# Patient Record
Sex: MALE | Race: WHITE | Employment: FULL TIME | URBAN - METROPOLITAN AREA
[De-identification: names, ages, dates, MRNs, and addresses within clinical notes are randomized per-mention and may not be internally consistent; named-entity substitution may affect disease eponyms.]

---

## 2023-01-19 ENCOUNTER — HOSPITAL ENCOUNTER (EMERGENCY)
Age: 38
Discharge: ANOTHER HOSPITAL | End: 2023-01-20
Attending: EMERGENCY MEDICINE
Payer: COMMERCIAL

## 2023-01-19 DIAGNOSIS — R45.851 SUICIDAL IDEATIONS: Primary | ICD-10-CM

## 2023-01-19 LAB
A/G RATIO: 2 (ref 1.1–2.2)
ACETAMINOPHEN LEVEL: <5 UG/ML (ref 10–30)
ALBUMIN SERPL-MCNC: 5 G/DL (ref 3.4–5)
ALP BLD-CCNC: 77 U/L (ref 40–129)
ALT SERPL-CCNC: 18 U/L (ref 10–40)
AMPHETAMINE SCREEN, URINE: NORMAL
ANION GAP SERPL CALCULATED.3IONS-SCNC: 11 MMOL/L (ref 3–16)
AST SERPL-CCNC: 15 U/L (ref 15–37)
BARBITURATE SCREEN URINE: NORMAL
BASOPHILS ABSOLUTE: 0.1 K/UL (ref 0–0.2)
BASOPHILS RELATIVE PERCENT: 1.1 %
BENZODIAZEPINE SCREEN, URINE: NORMAL
BILIRUB SERPL-MCNC: 0.3 MG/DL (ref 0–1)
BUN BLDV-MCNC: 12 MG/DL (ref 7–20)
CALCIUM SERPL-MCNC: 9.1 MG/DL (ref 8.3–10.6)
CANNABINOID SCREEN URINE: NORMAL
CHLORIDE BLD-SCNC: 103 MMOL/L (ref 99–110)
CO2: 23 MMOL/L (ref 21–32)
COCAINE METABOLITE SCREEN URINE: NORMAL
CREAT SERPL-MCNC: 0.9 MG/DL (ref 0.9–1.3)
EOSINOPHILS ABSOLUTE: 0.1 K/UL (ref 0–0.6)
EOSINOPHILS RELATIVE PERCENT: 0.9 %
ETHANOL: NORMAL MG/DL (ref 0–0.08)
FENTANYL SCREEN, URINE: NORMAL
GFR SERPL CREATININE-BSD FRML MDRD: >60 ML/MIN/{1.73_M2}
GLUCOSE BLD-MCNC: 97 MG/DL (ref 70–99)
HCT VFR BLD CALC: 43.8 % (ref 40.5–52.5)
HEMOGLOBIN: 15 G/DL (ref 13.5–17.5)
LYMPHOCYTES ABSOLUTE: 1.8 K/UL (ref 1–5.1)
LYMPHOCYTES RELATIVE PERCENT: 28.8 %
Lab: NORMAL
MCH RBC QN AUTO: 29.9 PG (ref 26–34)
MCHC RBC AUTO-ENTMCNC: 34.2 G/DL (ref 31–36)
MCV RBC AUTO: 87.3 FL (ref 80–100)
METHADONE SCREEN, URINE: NORMAL
MONOCYTES ABSOLUTE: 0.7 K/UL (ref 0–1.3)
MONOCYTES RELATIVE PERCENT: 10.7 %
NEUTROPHILS ABSOLUTE: 3.6 K/UL (ref 1.7–7.7)
NEUTROPHILS RELATIVE PERCENT: 58.5 %
OPIATE SCREEN URINE: NORMAL
OXYCODONE URINE: NORMAL
PDW BLD-RTO: 14.1 % (ref 12.4–15.4)
PH UA: 5
PHENCYCLIDINE SCREEN URINE: NORMAL
PLATELET # BLD: 185 K/UL (ref 135–450)
PMV BLD AUTO: 8.5 FL (ref 5–10.5)
POTASSIUM REFLEX MAGNESIUM: 4 MMOL/L (ref 3.5–5.1)
RBC # BLD: 5.01 M/UL (ref 4.2–5.9)
SALICYLATE, SERUM: <0.3 MG/DL (ref 15–30)
SARS-COV-2, NAAT: NOT DETECTED
SODIUM BLD-SCNC: 137 MMOL/L (ref 136–145)
TOTAL PROTEIN: 7.5 G/DL (ref 6.4–8.2)
WBC # BLD: 6.2 K/UL (ref 4–11)

## 2023-01-19 PROCEDURE — 80053 COMPREHEN METABOLIC PANEL: CPT

## 2023-01-19 PROCEDURE — 82077 ASSAY SPEC XCP UR&BREATH IA: CPT

## 2023-01-19 PROCEDURE — 80307 DRUG TEST PRSMV CHEM ANLYZR: CPT

## 2023-01-19 PROCEDURE — 80143 DRUG ASSAY ACETAMINOPHEN: CPT

## 2023-01-19 PROCEDURE — 87635 SARS-COV-2 COVID-19 AMP PRB: CPT

## 2023-01-19 PROCEDURE — 80179 DRUG ASSAY SALICYLATE: CPT

## 2023-01-19 PROCEDURE — 99285 EMERGENCY DEPT VISIT HI MDM: CPT

## 2023-01-19 PROCEDURE — 85025 COMPLETE CBC W/AUTO DIFF WBC: CPT

## 2023-01-19 RX ORDER — TRAZODONE HYDROCHLORIDE 50 MG/1
TABLET ORAL
COMMUNITY
Start: 2022-10-24

## 2023-01-19 RX ORDER — BUPROPION HYDROCHLORIDE 150 MG/1
TABLET, EXTENDED RELEASE ORAL
COMMUNITY
Start: 2022-10-24

## 2023-01-19 ASSESSMENT — PAIN - FUNCTIONAL ASSESSMENT: PAIN_FUNCTIONAL_ASSESSMENT: NONE - DENIES PAIN

## 2023-01-19 NOTE — ED NOTES
All personal belongings removed from patient and placed in bags with patient label. Bags contain shoes, pill bottle of Wellbutrin, wallet, shirt, hat, and pants. Wife, Lynn Stallworth, has possession of phone, ring, and smart watch.       Ludmila Verdin RN  01/19/23 6183

## 2023-01-19 NOTE — ED NOTES
ED RN Nicolas Davis removed all personal belongings and items from ED RM 05. All patient belongings placed in bag with patient label and placed outside of room with sitter. Daniel Sahu cowboy boots, Pill bottle of Wellbutrin, wallet, shirt, hat and pants. Patient wife Jessie Patterson has patients phone, ring, and smart watch. Karen AU-CNP at bedside.      Dolly Mcintosh  01/19/23 5208

## 2023-01-19 NOTE — ED NOTES
Patient resting comfortably in bed speaking and laughing with wife. Labs sent, waiting for patient to be able to urinate.      Dolly Mcintosh  01/19/23 9700

## 2023-01-20 VITALS
SYSTOLIC BLOOD PRESSURE: 111 MMHG | OXYGEN SATURATION: 95 % | RESPIRATION RATE: 16 BRPM | BODY MASS INDEX: 27.83 KG/M2 | TEMPERATURE: 97.5 F | DIASTOLIC BLOOD PRESSURE: 74 MMHG | WEIGHT: 210 LBS | HEIGHT: 73 IN | HEART RATE: 60 BPM

## 2023-01-20 NOTE — ED NOTES
Level of Care Disposition:  Hold in BAC until tomorrow--patient will be placed as a behavioral health hold, and be admitted in the morning. Patient was seen by ED provider and [facility] staff. The case presented to psychiatric provider on-call Claudia Hinton MD.  Based on the ED evaluation and information presented to the provider by Jass Delaney it is the recommendation of the on call psychiatric provider that inpatient hospitalization is the least restrictive environment for the patient at this time. The patient will be admitted to the inpatient unit.  Admitting provider did not order suicide precautions based on patient cesar for safety     Yoly Orozco RN  01/20/23 8849

## 2023-01-20 NOTE — CONSULTS
[facility],[address] and spoke to Pillai. Per Lowell \"It'll be quite a bit\". ED Provider made aware. 2140- Kellen RN called from [facility] stating she was ready to telepsych. 2148- Telepsych in process. 65- Telepsych completed with Kellen LEIJA.  Lily Smith RN for update. Per Neema Lang, Finishing up last patients chart and then she will call . 1- Kellen LEIJA called stating they want patient to be admitted but [facility] has no bed availability until tomorrow possibly. Patient can be a hold in [place] ED or tx to another facility. 200- Called [facility], spoke to [facility] RN to send packet to [facility]. 0107- Samantha LEIJA [facility] faxed packet. [address] [facility] called. [facility] accepted to [facility] by [employer]. [facility] needs 72 hr hold faxed to [phone].  0155- 72 hr hold faxed. [address] Jaycob Abrams and spoke to [place]. Per [place] did not receive fax. 0230- resent 72 hour hold. Santaquin confirmed she received it. 46- Per [place]quin with [facility] intake, patient going to the [facility] and report number is [id number]. Transport scheduled with [address] ambulance for [facility] 55 made aware of bed assignment and report # given. Also update on transport ETA.

## 2023-01-20 NOTE — ED PROVIDER NOTES
I independently performed a history and physical on Desirae Duenas. I personally saw the patient and performed a substantive portion of the visit including all aspects of the medical decision making. All diagnostic, treatment, and disposition decisions were made by myself in conjunction with the advanced practice provider. I have participated in the medical decision making and directed the treatment plan and disposition of the patient. For further details of [facility] emergency department encounter, please see the advanced practice provider's documentation. CHIEF COMPLAINT  Chief Complaint   Patient presents with    Suicidal     Pt arrives with Wife with c/o suicidal thoughts. Pt states he is on Wellbutrin, but thinks he needs his dose increased. Pt states he has a plan - any object he sees he states he can plan to use. Briefly, Desirae Duenas is a 30 y.o. male  who presents to the ED complaining of suicidal thoughts    FOCUSED PHYSICAL EXAMINATION  /63   Pulse 75   Temp 98 °F (36.7 °C) (Oral)   Resp 18   Ht 6' 1\" (1.854 m)   Wt 210 lb (95.3 kg)   SpO2 97%   BMI 27.71 kg/m²      Focused physical examination:  General appearance:  Cooperative. No acute distress. Skin:  Warm. Dry. Eye:  Extraocular movements intact. Ears, nose, mouth and throat:  Oral mucosa moist,  Neck:  Trachea midline. Heart:  Regular rate and rhythm  Perfusion:  intact  Respiratory:  Respirations nonlabored. Lungs clear to auscultation bilaterally. Abdominal:   Non distended. Nontender  Neurological:  Alert and oriented x 3. Moves all extremities spontaneously  Musculoskeletal:   Normal ROM, no deformities          Psychiatric: Denies hallucinations but does endorse suicidal thoughts    Patient presents to the emergency department today with suicidal thoughts. Medically cleared here. Seen by psychiatry with request for admission. Otherwise hemodynamically stable at this time.   No concern for any other acute medical etiology to the patient's symptoms    History From: Patient         Chronic Conditions: Noted in HPI    CONSULTS: (Who and What was discussed)  None            Disposition Considerations (Tests not ordered but considered, Shared Decision Making, Pt Expectation of Test or Tx.):     During the patient's ED course, the patient was given:  Medications - No data to display     CLINICAL IMPRESSION  1. Suicidal ideations        DISPOSITION  Transfer      This chart was created using Dragon dictation software. Efforts were made by me to ensure accuracy, however some errors may be present due to limitations of this technology.             Wolfgang Heaton MD  01/20/23 6052

## 2023-01-20 NOTE — ED NOTES
Sitter at bedside. Pt in paper scrubs. All belongings removed from room by previous RN.       Artemio Schneider RN  01/19/23 5102

## 2023-01-20 NOTE — ED PROVIDER NOTES
[facility]  ED  EMERGENCY DEPARTMENT ENCOUNTER        Pt Name: Bernice Crabtree  MRN: [id number]  Armstrevorgfronn 1985  Date of evaluation: 1/19/2023  Provider: ANGELY Urbano CNP  PCP: No primary care provider on file. Note Started: 11:52 PM EST 1/19/23      SHARAD. I have evaluated this patient. My supervising physician was available for consultation. CHIEF COMPLAINT       Chief Complaint   Patient presents with    Suicidal     Pt arrives with Wife with c/o suicidal thoughts. Pt states he is on Wellbutrin, but thinks he needs his dose increased. Pt states he has a plan - any object he sees he states he can plan to use. HISTORY OF PRESENT ILLNESS: 1 or more Elements     History from : Patient    Limitations to history : None    Bernice Crabtree is a 30 y.o. male who presents with complaints of suicidal thoughts. Patient states that he is on medications for his depression but he thinks that he needs his dose increased, he states that he has been having suicidal thoughts for a while, he states that he does not actually have a set plan but he says he sees different objects and thinks of how he could kill himself with that object. He denies any chest pain difficulty breathing or shortness of breath no abdominal pain, nausea or vomiting he denies any drug use or alcohol abuse. He is here for further evaluation    Nursing Notes were all reviewed and agreed with or any disagreements were addressed in the HPI. REVIEW OF SYSTEMS :      Review of Systems    Positives and Pertinent negatives as per HPI. SURGICAL HISTORY   History reviewed. No pertinent surgical history.     CURRENTMEDICATIONS       Previous Medications    BUPROPION (WELLBUTRIN SR) 150 MG EXTENDED RELEASE TABLET    TAKE 1 TABLET BY MOUTH TWICE A DAY    TRAZODONE (DESYREL) 50 MG TABLET    TAKE 1 TAB(S) ORALLY ONCE A DAY, AT BEDTIME, AS NEEDED       ALLERGIES     Demerol hcl [meperidine]    FAMILYHISTORY     History reviewed. No pertinent family history. SOCIAL HISTORY       Social History     Tobacco Use    Smoking status: Never    Smokeless tobacco: Never   Vaping Use    Vaping Use: Never used   Substance Use Topics    Alcohol use: Yes     Comment: occ    Drug use: Never       SCREENINGS        [place] Coma Scale  Eye Opening: Spontaneous  Best Verbal Response: Oriented  Best Motor Response: Obeys commands  [place] Coma Scale Score: 15                CIWA Assessment  BP: 110/63  Heart Rate: 75           PHYSICAL EXAM  1 or more Elements     ED Triage Vitals [01/19/23 1801]   BP Temp Temp Source Heart Rate Resp SpO2 Height Weight   126/83 98 °F (36.7 °C) Oral 66 18 98 % 6' 1\" (1.854 m) 210 lb (95.3 kg)       Physical Exam    Lungs clear to auscultation bilaterally heart regular in rhythm, abdomen is soft, nontender. Patient does have suicidal ideations, he is alert and oriented    DIAGNOSTIC RESULTS   LABS:    Labs Reviewed   ACETAMINOPHEN LEVEL - Abnormal; Notable for the following components:       Result Value    Acetaminophen Level <5 (*)     All other components within normal limits   SALICYLATE LEVEL - Abnormal; Notable for the following components:    Salicylate, Serum <8.9 (*)     All other components within normal limits   COVID-19, RAPID   CBC WITH AUTO DIFFERENTIAL   COMPREHENSIVE METABOLIC PANEL W/ REFLEX TO MG FOR LOW K   ETHANOL   URINE DRUG SCREEN       When ordered only abnormal lab results are displayed. All other labs were within normal range or not returned as of this dictation. EKG: When ordered, EKG's are interpreted by the Emergency Department Physician in the absence of a cardiologist.  Please see their note for interpretation of EKG.     RADIOLOGY:   Non-plain film images such as CT, Ultrasound and MRI are read by the radiologist. Plain radiographic images are visualized and preliminarily interpreted by the ED Provider with the below findings:        Interpretation per the Radiologist below, if available at the time of this note:    No orders to display     No results found. No results found. PROCEDURES   Unless otherwise noted below, none     Procedures    CRITICAL CARE TIME (.cctime)       PAST MEDICAL HISTORY      has a past medical history of Depression. Chronic Conditions affecting Care:     EMERGENCY DEPARTMENT COURSE and DIFFERENTIAL DIAGNOSIS/MDM:   Vitals:    Vitals:    01/19/23 1801 01/19/23 2026 01/19/23 2315   BP: 126/83 119/78 110/63   Pulse: 66 74 75   Resp: 18 18 18   Temp: 98 °F (36.7 °C)     TempSrc: Oral     SpO2: 98% 99% 97%   Weight: 210 lb (95.3 kg)     Height: 6' 1\" (1.854 m)         Patient was given the following medications:  Medications - No data to display          Is this patient to be included in the SEP-1 Core Measure due to severe sepsis or septic shock? No   Exclusion criteria - the patient is NOT to be included for SEP-1 Core Measure due to: Infection is not suspected    CONSULTS: (Who and What was discussed)  None  Discussion with Other Profesionals : None    Social Determinants : None    Records Reviewed : None    CC/HPI Summary, DDx, ED Course, and Reassessment: Patient presented to the emergency department today with complaints of suicidal ideations. He has a history of depression, was on medication but thinks he needs a medication changed or dose change. Patient had no specific plan, he states that he does see different objects though and think about ways he could use to kill himself. I am the Primary Clinician of Record. FINAL IMPRESSION      1. Suicidal ideations          DISPOSITION/PLAN     DISPOSITION     PATIENT REFERRED TO:  No follow-up provider specified.     DISCHARGE MEDICATIONS:  New Prescriptions    No medications on file       DISCONTINUED MEDICATIONS:  Discontinued Medications    No medications on file              (Please note that portions of this note were completed with a voice recognition program.  Efforts were made to edit (Please note that portions of this note were completed with a voice recognition program.  Efforts were made to edit the dictations but occasionally words are mis-transcribed.)    ANGELY Tate CNP (electronically signed)      ANGELY Tate CNP  01/23/23 2808

## 2023-01-20 NOTE — ED NOTES
Patient expressing to wife how irritated he is that he has been here for over 3 hours     Dolly Mcintosh  01/19/23 2122

## 2023-01-20 NOTE — ED NOTES
This RN gave report to medics. Pt left stable on stretcher by ambulance.  Report called to the \"hope\" unit at [facility]  01/20/23 [address] Ajay Cedeño RN  01/20/23 7168

## 2023-01-20 NOTE — ED NOTES
Collateral Contact:  Name: Jose De Jesus Latham   Phone:[phone]  Relation to Patient: Wife  Last Contact with Patient: with the patient at this time. Concerns:     Jose De Jesus Latham states the patient has been having intrusive thoughts for the past few months. He started seeing a therapist and a psychiatrist in July. That was going well until he switched jobs in November which caused him to also switch insurance coverage. He has not been able to find another psychiatrist. She states that the patient believes he needs his meds adjusted. He had one refill of wellbutrin remaining from his previous doctor and she knows he is running low on meds and believes he is worried about that also. She does not believe he would actually try to harm himself, although they do have 2 guns at home in the basement.        Christie Medina RN  01/19/23 3065

## 2023-01-20 NOTE — ED NOTES
responsibilities taken over by Henry Spatz. PCA update on patient and given report. PCA given constant observer flow sheet to continue monitoring patient.      Dolly Mcintosh  01/19/23 3672

## 2023-01-20 NOTE — ED NOTES
Patient ambulation to and from restroom with  without any issues. Patient provided urine sample. Sample sent to lab.      [facility] Arnaldo  01/19/23 1954

## 2023-01-20 NOTE — ED NOTES
Called Kellen LEIJA at [facility] to ask for update on dispo of patient. Gianluca Adriana stated she was finishing up her note on her last patient and then she will be calling The on call Psychiatrist to review patients chart.      Dolly Mcintosh  01/20/23 0020

## 2023-01-20 NOTE — ED NOTES
Jay Morgan RN updated on Chapincito Hernandez MD decision that patient requires inpatient psychiatric admission.       Kelly Comer RN  01/20/23 2044

## 2023-01-20 NOTE — ED NOTES
Evaluation completed via Tele-health    Presenting Problem:Patient presents to [facility] ED voluntarily after patient had appointment with his counselor SUMEET Sanders and endorsed suicidal thoughts x1 month--Meera instructed patient to go to ED. Patient reports that his SI has been ongoing and worsening in severity denies formulated plan, but reports intrusive thoughts of seeing different objects throughout room he would walk into and formulated a plan on how he could use that item to commit suicide or harm himself. Patient reports that he will work through the plan, and then continue on with the task he was originally doing. Patent denies that he has intent, but fears he may if the thoughts continue. Patient cesar for safety. Denies HI    Appearance/Hygiene:  Patient appearance congruent with listed age, well-appearing, hospital attire, seated in bed, good grooming, and good hygiene. Motor Behavior: Patient is calm with purposeful non-aggressive movements. Denies use of ambulatory aids, or abnormality with gait. Attitude: cooperative  Affect: depressed affect   Speech: normal pitch and normal volume  Mood: depressed   Thought Processes: Goal directed and Logical  Perceptions: Denies AVH, but reports envisioning himself hanging. Thought content: Patient reports ongoing intrusive thoughts of how he can commit suicide with random objects around him--ongoing for x1mth. Patient reports that he feels out of control and needs to get help. Orientation: A&Ox4   Memory: intact  Concentration: Good    Insight/ judgement: fair insight and judgment      Psychosocial and contextual factors: Patient is a 33yo,  male. Patient is reports that he lives with his wife, and a roommate--out of town. Patient currently employed. Patient reports feeling safe in his home. Patient denies illicit substance, or alcohol abuse.  Reports a strong support support system including his friends, family, and wife, but patient feels that he is unable to speak openly with his support system because he does not want to \"bear his soul\". Patient endorses depression lasting \"a decade\" with sporadic treatment since 2010. Patient reports feelings of helplessness, hopelessness, anhedonia, and decreased motivation resulting in decreased hygiene practices, and calling of work more frequently stating, \"I just want to lay in bed\". Patient reports change in sleep patter including frequently waking-up, and having difficulty falling asleep resulting in <6hrs, nightly and reports that he feels constant fatigue even with increased sleep. Patient reports decreased appetite with no substantial weight loss <5lb x1yr, but reports that he often is unable to complete meals when prior he would \"clear the plate\". Denies hx of trauma or abuse. Patient reports one past suicide attempt in 2010 after his \"high school sweetheart\" of 7yrs broke up with him. Patient reports this was the \"triggering\" event of his chronic depression. Patient reports that he he attempted to kill himself via firearm, but was unsuccessful. Patient reports that they believed his depression at the time was an circumstantial, but since 2010 he has been suffering with depression. Patient reports that he is able to make plans for the future, but does not look forward to events, plans, or holidays. C-SSRS flowsheet Complete. Psychiatric History (including current outpatient provider and past inpatient admissions): Depression    Inpatient: Denies    Outpatient:  Carina Burkett, [facility]: saw patient today for the first time since Decemeber. Most recent psychiatrist: started to see them in July, 2022 but insurance changed in November, and has not seen this provider since Oct, 2022. Patient was placed on Wellbutrin at this time. Patient has only sought outpatient treatment 2x prior including 2010 which lasted 6mths, and 2018 <1yr.      Medications: Reports compliance, but only has 1 refill remaining--patient does not feel Wellbutrin is working at this time related to new onset SI. No current facility-administered medications on file prior to encounter.      Current Outpatient Medications on File Prior to Encounter   Medication Sig Dispense Refill    buPROPion (WELLBUTRIN SR) 150 MG extended release tablet TAKE 1 TABLET BY MOUTH TWICE A DAY      traZODone (DESYREL) 50 MG tablet TAKE 1 TAB(S) ORALLY ONCE A DAY, AT BEDTIME, AS NEEDED         Access to Firearms: Access to 2 guns in home    ASSESSMENT FOR IMMINENT FUTURE DANGER:    RISK FACTORS:    []  Age <25 or >55   [x]  Male gender   [x]  Depressed mood   [x]  Active suicidal ideation   [x]  Suicide plan: continually changing    []  Suicide attempt   [x]  Access to lethal means: firearms in home   [x]  Prior suicide attempt: 2010 via firearm   []  Active substance abuse (if yes pleases add details )   []  Highly impulsive behaviors   [x]  Not attending to self-care/ADLs    []  Recent significant loss   []  Chronic pain or medical illness   [x]  Social isolation   []  History of violence (if yes please elaborate )   []  Active psychosis   []  Cognitive impairment    [x]  No outpatient services in place: No medication management follow-up   []  Medication noncompliance   []  No collateral information to support safety  [] Self- injurious/ Self-harm behavior    PROTECTIVE FACTORS:  [x] Age >25 and <55  [] Female gender   [] Denies depression  [] Denies suicidal ideation  [] Does not have lethal plan   [] Does not have access to guns or weapons  [x] Patient is verbally cesar for safety  [] No prior suicide attempts  [x] No active substance abuse  [x] Patient has social or family support  [x] No active psychosis or cognitive dysfunction  [x] Physically healthy  [x] Has outpatient services in place: Patient has counselor that he sees periodically  [x] Compliant with recommended medications  [x] Collateral information from wife Trevor Ervin supports patient safety   [] Patient is future oriented with plans to            Darlin Bell RN  01/19/23 2976

## 2025-07-24 ENCOUNTER — OFFICE VISIT (OUTPATIENT)
Dept: INTERNAL MEDICINE CLINIC | Age: 40
End: 2025-07-24
Payer: COMMERCIAL

## 2025-07-24 VITALS
DIASTOLIC BLOOD PRESSURE: 80 MMHG | HEART RATE: 73 BPM | OXYGEN SATURATION: 97 % | BODY MASS INDEX: 30.64 KG/M2 | HEIGHT: 73 IN | WEIGHT: 231.2 LBS | SYSTOLIC BLOOD PRESSURE: 118 MMHG

## 2025-07-24 DIAGNOSIS — Z00.00 ANNUAL PHYSICAL EXAM: ICD-10-CM

## 2025-07-24 DIAGNOSIS — L02.811 CUTANEOUS ABSCESS OF HEAD EXCLUDING FACE: Primary | ICD-10-CM

## 2025-07-24 PROCEDURE — 99204 OFFICE O/P NEW MOD 45 MIN: CPT

## 2025-07-24 RX ORDER — PROPRANOLOL HYDROCHLORIDE 10 MG/1
10 TABLET ORAL 2 TIMES DAILY
COMMUNITY
Start: 2025-07-18

## 2025-07-24 RX ORDER — BUPROPION HCL 300 MG
300 TABLET, EXTENDED RELEASE 24 HR ORAL EVERY MORNING
COMMUNITY
Start: 2023-02-24 | End: 2025-07-24

## 2025-07-24 RX ORDER — SULFAMETHOXAZOLE AND TRIMETHOPRIM 800; 160 MG/1; MG/1
1 TABLET ORAL 2 TIMES DAILY
Qty: 20 TABLET | Refills: 0 | Status: SHIPPED | OUTPATIENT
Start: 2025-07-24 | End: 2025-08-03

## 2025-07-24 SDOH — HEALTH STABILITY: PHYSICAL HEALTH: ON AVERAGE, HOW MANY DAYS PER WEEK DO YOU ENGAGE IN MODERATE TO STRENUOUS EXERCISE (LIKE A BRISK WALK)?: 1 DAY

## 2025-07-24 SDOH — ECONOMIC STABILITY: FOOD INSECURITY: WITHIN THE PAST 12 MONTHS, YOU WORRIED THAT YOUR FOOD WOULD RUN OUT BEFORE YOU GOT MONEY TO BUY MORE.: NEVER TRUE

## 2025-07-24 SDOH — HEALTH STABILITY: PHYSICAL HEALTH: ON AVERAGE, HOW MANY MINUTES DO YOU ENGAGE IN EXERCISE AT THIS LEVEL?: 40 MIN

## 2025-07-24 SDOH — ECONOMIC STABILITY: FOOD INSECURITY: WITHIN THE PAST 12 MONTHS, THE FOOD YOU BOUGHT JUST DIDN'T LAST AND YOU DIDN'T HAVE MONEY TO GET MORE.: NEVER TRUE

## 2025-07-24 ASSESSMENT — PATIENT HEALTH QUESTIONNAIRE - PHQ9
5. POOR APPETITE OR OVEREATING: NOT AT ALL
3. TROUBLE FALLING OR STAYING ASLEEP: NEARLY EVERY DAY
4. FEELING TIRED OR HAVING LITTLE ENERGY: NEARLY EVERY DAY
8. MOVING OR SPEAKING SO SLOWLY THAT OTHER PEOPLE COULD HAVE NOTICED. OR THE OPPOSITE, BEING SO FIGETY OR RESTLESS THAT YOU HAVE BEEN MOVING AROUND A LOT MORE THAN USUAL: NOT AT ALL
SUM OF ALL RESPONSES TO PHQ QUESTIONS 1-9: 11
SUM OF ALL RESPONSES TO PHQ QUESTIONS 1-9: 11
10. IF YOU CHECKED OFF ANY PROBLEMS, HOW DIFFICULT HAVE THESE PROBLEMS MADE IT FOR YOU TO DO YOUR WORK, TAKE CARE OF THINGS AT HOME, OR GET ALONG WITH OTHER PEOPLE: SOMEWHAT DIFFICULT
9. THOUGHTS THAT YOU WOULD BE BETTER OFF DEAD, OR OF HURTING YOURSELF: NOT AT ALL
SUM OF ALL RESPONSES TO PHQ QUESTIONS 1-9: 11
2. FEELING DOWN, DEPRESSED OR HOPELESS: SEVERAL DAYS
SUM OF ALL RESPONSES TO PHQ QUESTIONS 1-9: 11
7. TROUBLE CONCENTRATING ON THINGS, SUCH AS READING THE NEWSPAPER OR WATCHING TELEVISION: NOT AT ALL
1. LITTLE INTEREST OR PLEASURE IN DOING THINGS: MORE THAN HALF THE DAYS
6. FEELING BAD ABOUT YOURSELF - OR THAT YOU ARE A FAILURE OR HAVE LET YOURSELF OR YOUR FAMILY DOWN: MORE THAN HALF THE DAYS

## 2025-07-24 NOTE — PROGRESS NOTES
2025    Chan Martinez (:  1985) is a 40 y.o. male, here to establish care as a new patient.     History of Present Illness  The patient presents to establish care and for evaluation of a lump on the back of his head.    He has been living with a small, subcutaneous lump on the back of his head for several years. The lump has slightly increased in size over the past few months, particularly after he accidentally nicked it while shaving his head on 2025. Since then, the lump has progressively enlarged, become inflamed, swollen, and painful. He reports no discharge of pus or blood from the lump. He also reports no presence of similar lesions elsewhere on his body. He expresses a desire to have the lump removed.    He is currently under the care of a psychiatrist for depression and is satisfied with his treatment plan. He consults with his psychiatrist virtually through [facility]. He is on Wellbutrin 450 mg daily and reports no recent suicidal or homicidal thoughts. He was hospitalized in a psychiatric facility for 5 to 6 days in 2023 due to suicidal ideation. His last hospitalization prior to this was in 2018, which was an overnight psychiatric stay for evaluation. He has not had any similar thoughts since 2023. He is also on propranolol, which was recently refilled by his psychiatrist. He is running low on Wellbutrin and is unsure if he has any refills left.    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 Before his first birthday, he underwent a gastric bypass surgery. 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 He occasionally notices minor blood clots in his stool, which he attributes to his rectal surgeries. This occurs at least once a week and has been ongoing for as long as he can remember. He does not believe a gastroenterologist's opinion is necessary

## 2025-07-25 LAB
ALBUMIN SERPL-MCNC: 4.7 G/DL (ref 3.4–5)
ALP SERPL-CCNC: 78 U/L (ref 40–129)
ALT SERPL-CCNC: 21 U/L (ref 10–40)
ANION GAP SERPL CALCULATED.3IONS-SCNC: 15 MMOL/L (ref 3–16)
AST SERPL-CCNC: 18 U/L (ref 15–37)
BASOPHILS # BLD: 0 K/UL (ref 0–0.2)
BASOPHILS NFR BLD: 0.8 %
BILIRUB DIRECT SERPL-MCNC: 0.2 MG/DL (ref 0–0.3)
BILIRUB INDIRECT SERPL-MCNC: 0.3 MG/DL (ref 0–1)
BILIRUB SERPL-MCNC: 0.5 MG/DL (ref 0–1)
BUN SERPL-MCNC: 13 MG/DL (ref 7–20)
CALCIUM SERPL-MCNC: 9.3 MG/DL (ref 8.3–10.6)
CHLORIDE SERPL-SCNC: 102 MMOL/L (ref 99–110)
CHOLEST SERPL-MCNC: 151 MG/DL (ref 0–199)
CO2 SERPL-SCNC: 24 MMOL/L (ref 21–32)
CREAT SERPL-MCNC: 1.1 MG/DL (ref 0.9–1.3)
CREAT UR-MCNC: 261 MG/DL (ref 39–259)
DEPRECATED RDW RBC AUTO: 13.8 % (ref 12.4–15.4)
EOSINOPHIL # BLD: 0 K/UL (ref 0–0.6)
EOSINOPHIL NFR BLD: 0.6 %
EST. AVERAGE GLUCOSE BLD GHB EST-MCNC: 91.1 MG/DL
GFR SERPLBLD CREATININE-BSD FMLA CKD-EPI: 88 ML/MIN/{1.73_M2}
GLUCOSE SERPL-MCNC: 139 MG/DL (ref 70–99)
HBA1C MFR BLD: 4.8 %
HCT VFR BLD AUTO: 41.5 % (ref 40.5–52.5)
HDLC SERPL-MCNC: 36 MG/DL (ref 40–60)
HGB BLD-MCNC: 14.4 G/DL (ref 13.5–17.5)
LDLC SERPL CALC-MCNC: 72 MG/DL
LYMPHOCYTES # BLD: 1.7 K/UL (ref 1–5.1)
LYMPHOCYTES NFR BLD: 26.3 %
MCH RBC QN AUTO: 29.9 PG (ref 26–34)
MCHC RBC AUTO-ENTMCNC: 34.8 G/DL (ref 31–36)
MCV RBC AUTO: 85.9 FL (ref 80–100)
MICROALBUMIN UR DL<=1MG/L-MCNC: 1.47 MG/DL
MICROALBUMIN/CREAT UR: 5.6 MG/G (ref 0–30)
MONOCYTES # BLD: 0.6 K/UL (ref 0–1.3)
MONOCYTES NFR BLD: 9.4 %
NEUTROPHILS # BLD: 4 K/UL (ref 1.7–7.7)
NEUTROPHILS NFR BLD: 62.9 %
PLATELET # BLD AUTO: 183 K/UL (ref 135–450)
PMV BLD AUTO: 11.6 FL (ref 5–10.5)
POTASSIUM SERPL-SCNC: 3.7 MMOL/L (ref 3.5–5.1)
PROT SERPL-MCNC: 7.3 G/DL (ref 6.4–8.2)
RBC # BLD AUTO: 4.83 M/UL (ref 4.2–5.9)
SODIUM SERPL-SCNC: 141 MMOL/L (ref 136–145)
TRIGL SERPL-MCNC: 216 MG/DL (ref 0–150)
VLDLC SERPL CALC-MCNC: 43 MG/DL
WBC # BLD AUTO: 6.4 K/UL (ref 4–11)

## 2025-07-28 PROBLEM — R80.9 ASYMPTOMATIC PROTEINURIA: Status: ACTIVE | Noted: 2025-07-28

## 2025-07-28 PROBLEM — E78.1 PURE HYPERTRIGLYCERIDEMIA: Status: ACTIVE | Noted: 2025-07-28

## 2025-07-31 ENCOUNTER — OFFICE VISIT (OUTPATIENT)
Dept: SURGERY | Age: 40
End: 2025-07-31
Payer: COMMERCIAL

## 2025-07-31 VITALS
BODY MASS INDEX: 29.95 KG/M2 | WEIGHT: 226 LBS | HEIGHT: 73 IN | DIASTOLIC BLOOD PRESSURE: 72 MMHG | SYSTOLIC BLOOD PRESSURE: 112 MMHG

## 2025-07-31 DIAGNOSIS — L08.9 INFECTED SEBACEOUS CYST: Primary | ICD-10-CM

## 2025-07-31 DIAGNOSIS — L72.3 INFECTED SEBACEOUS CYST: Primary | ICD-10-CM

## 2025-07-31 PROCEDURE — 10060 I&D ABSCESS SIMPLE/SINGLE: CPT | Performed by: SURGERY

## 2025-07-31 RX ORDER — LIDOCAINE HYDROCHLORIDE AND EPINEPHRINE 10; 10 MG/ML; UG/ML
5 INJECTION, SOLUTION INFILTRATION; PERINEURAL ONCE
Status: COMPLETED | OUTPATIENT
Start: 2025-07-31 | End: 2025-07-31

## 2025-07-31 RX ADMIN — LIDOCAINE HYDROCHLORIDE AND EPINEPHRINE 5 ML: 10; 10 INJECTION, SOLUTION INFILTRATION; PERINEURAL at 15:45

## 2025-07-31 ASSESSMENT — ENCOUNTER SYMPTOMS
ABDOMINAL PAIN: 0
COLOR CHANGE: 1
CHEST TIGHTNESS: 0
EYE DISCHARGE: 0
ABDOMINAL DISTENTION: 0
BACK PAIN: 0
EYE ITCHING: 0
APNEA: 0

## 2025-07-31 NOTE — PROGRESS NOTES
:     Chan Martinez is a 40 y.o. male     CC: Infected sebaceous cyst    HPI: 40-year-old male with an infected sebaceous cyst of the left posterior neck      Past Medical History:   Diagnosis Date    Anxiety     Depression     Hearing loss        Demerol hcl [meperidine]     Past Surgical History:   Procedure Laterality Date    TONSILLECTOMY      UPPER GASTROINTESTINAL ENDOSCOPY      VASECTOMY          Prior to Visit Medications    Medication Sig Taking? Authorizing Provider   propranolol (INDERAL) 10 MG immediate release tablet Take 1 tablet by mouth 2 times daily  Patient taking differently: Take 1 tablet by mouth daily Yes Janelle Jones MD   sulfamethoxazole-trimethoprim (BACTRIM DS;SEPTRA DS) 800-160 MG per tablet Take 1 tablet by mouth 2 times daily for 10 days Yes Antonio Alaniz DO   buPROPion (WELLBUTRIN SR) 150 MG extended release tablet Take 3 tablets by mouth daily Yes aJnelle Jones MD   traZODone (DESYREL) 50 MG tablet  Yes Janelle Jones MD       Social History     Socioeconomic History    Marital status:      Spouse name: Not on file    Number of children: Not on file    Years of education: Not on file    Highest education level: Not on file   Occupational History    Not on file   Tobacco Use    Smoking status: Never    Smokeless tobacco: Never   Vaping Use    Vaping status: Never Used   Substance and Sexual Activity    Alcohol use: Yes     Alcohol/week: 4.0 standard drinks of alcohol     Types: 4 Shots of liquor per week     Comment: 4 shots per week    Drug use: Never    Sexual activity: Yes     Partners: Female   Other Topics Concern    Not on file   Social History Narrative    Not on file     Social Drivers of Health     Financial Resource Strain: Not on file   Food Insecurity: No Food Insecurity (7/24/2025)    Hunger Vital Sign     Worried About Running Out of Food in the Last Year: Never true     Ran Out of Food in the Last Year: Never true   Transportation Needs:

## 2025-09-04 ENCOUNTER — PROCEDURE VISIT (OUTPATIENT)
Dept: SURGERY | Age: 40
End: 2025-09-04

## 2025-09-04 VITALS — SYSTOLIC BLOOD PRESSURE: 118 MMHG | DIASTOLIC BLOOD PRESSURE: 78 MMHG

## 2025-09-04 DIAGNOSIS — L72.3 INFECTED SEBACEOUS CYST: Primary | ICD-10-CM

## 2025-09-04 DIAGNOSIS — L08.9 INFECTED SEBACEOUS CYST: Primary | ICD-10-CM

## 2025-09-04 RX ORDER — LIDOCAINE HYDROCHLORIDE AND EPINEPHRINE 10; 10 MG/ML; UG/ML
7 INJECTION, SOLUTION INFILTRATION; PERINEURAL ONCE
Status: COMPLETED | OUTPATIENT
Start: 2025-09-04 | End: 2025-09-04

## 2025-09-04 RX ADMIN — LIDOCAINE HYDROCHLORIDE AND EPINEPHRINE 7 ML: 10; 10 INJECTION, SOLUTION INFILTRATION; PERINEURAL at 14:22
